# Patient Record
Sex: FEMALE | Race: WHITE | URBAN - METROPOLITAN AREA
[De-identification: names, ages, dates, MRNs, and addresses within clinical notes are randomized per-mention and may not be internally consistent; named-entity substitution may affect disease eponyms.]

---

## 2023-10-25 PROBLEM — O99.212 SEVERE OBESITY DUE TO EXCESS CALORIES AFFECTING PREGNANCY IN SECOND TRIMESTER (HCC): Status: ACTIVE | Noted: 2023-10-25

## 2023-10-25 PROBLEM — E66.01 SEVERE OBESITY DUE TO EXCESS CALORIES AFFECTING PREGNANCY IN SECOND TRIMESTER (HCC): Status: ACTIVE | Noted: 2023-10-25

## 2024-02-13 ENCOUNTER — HOSPITAL ENCOUNTER (INPATIENT)
Age: 34
LOS: 2 days | Discharge: HOME | DRG: 560 | End: 2024-02-16
Attending: OBSTETRICS & GYNECOLOGY | Admitting: OBSTETRICS & GYNECOLOGY
Payer: MEDICAID

## 2024-02-13 ENCOUNTER — APPOINTMENT (OUTPATIENT)
Dept: ULTRASOUND IMAGING | Age: 34
DRG: 560 | End: 2024-02-13
Payer: MEDICAID

## 2024-02-13 PROBLEM — Z3A.34 34 WEEKS GESTATION OF PREGNANCY: Status: ACTIVE | Noted: 2024-02-13

## 2024-02-13 LAB
ALBUMIN SERPL-MCNC: 3.2 G/DL (ref 3.5–5.2)
ALP SERPL-CCNC: 104 U/L (ref 35–104)
ALT SERPL-CCNC: 15 U/L (ref 0–32)
AMPHET UR QL SCN: NEGATIVE
ANION GAP SERPL CALCULATED.3IONS-SCNC: 16 MMOL/L (ref 7–16)
AST SERPL-CCNC: 31 U/L (ref 0–31)
BARBITURATES UR QL SCN: NEGATIVE
BASOPHILS # BLD: 0.03 K/UL (ref 0–0.2)
BASOPHILS NFR BLD: 0 % (ref 0–2)
BENZODIAZ UR QL: NEGATIVE
BILIRUB SERPL-MCNC: 0.2 MG/DL (ref 0–1.2)
BILIRUB UR QL STRIP: NEGATIVE
BUN SERPL-MCNC: 8 MG/DL (ref 6–20)
BUPRENORPHINE UR QL: NEGATIVE
CALCIUM SERPL-MCNC: 9.2 MG/DL (ref 8.6–10.2)
CANNABINOIDS UR QL SCN: NEGATIVE
CHLORIDE SERPL-SCNC: 103 MMOL/L (ref 98–107)
CLARITY UR: CLEAR
CO2 SERPL-SCNC: 19 MMOL/L (ref 22–29)
COCAINE UR QL SCN: NEGATIVE
COLOR UR: YELLOW
CREAT SERPL-MCNC: 0.5 MG/DL (ref 0.5–1)
EOSINOPHIL # BLD: 0 K/UL (ref 0.05–0.5)
EOSINOPHILS RELATIVE PERCENT: 0 % (ref 0–6)
ERYTHROCYTE [DISTWIDTH] IN BLOOD BY AUTOMATED COUNT: 14 % (ref 11.5–15)
FENTANYL UR QL: NEGATIVE
GFR SERPL CREATININE-BSD FRML MDRD: >60 ML/MIN/1.73M2
GLUCOSE SERPL-MCNC: 80 MG/DL (ref 74–99)
GLUCOSE UR STRIP-MCNC: NEGATIVE MG/DL
HCT VFR BLD AUTO: 33.4 % (ref 34–48)
HGB BLD-MCNC: 11 G/DL (ref 11.5–15.5)
HGB UR QL STRIP.AUTO: NEGATIVE
IMM GRANULOCYTES # BLD AUTO: 0.07 K/UL (ref 0–0.58)
IMM GRANULOCYTES NFR BLD: 1 % (ref 0–5)
KETONES UR STRIP-MCNC: NEGATIVE MG/DL
LEUKOCYTE ESTERASE UR QL STRIP: NEGATIVE
LYMPHOCYTES NFR BLD: 2 K/UL (ref 1.5–4)
LYMPHOCYTES RELATIVE PERCENT: 14 % (ref 20–42)
MCH RBC QN AUTO: 27.2 PG (ref 26–35)
MCHC RBC AUTO-ENTMCNC: 32.9 G/DL (ref 32–34.5)
MCV RBC AUTO: 82.5 FL (ref 80–99.9)
METHADONE UR QL: NEGATIVE
MONOCYTES NFR BLD: 0.75 K/UL (ref 0.1–0.95)
MONOCYTES NFR BLD: 5 % (ref 2–12)
NEUTROPHILS NFR BLD: 80 % (ref 43–80)
NEUTS SEG NFR BLD: 11.24 K/UL (ref 1.8–7.3)
NITRITE UR QL STRIP: NEGATIVE
OPIATES UR QL SCN: NEGATIVE
OXYCODONE UR QL SCN: NEGATIVE
PCP UR QL SCN: NEGATIVE
PH UR STRIP: 6 [PH] (ref 5–9)
PLATELET # BLD AUTO: 278 K/UL (ref 130–450)
PMV BLD AUTO: 11.8 FL (ref 7–12)
POTASSIUM SERPL-SCNC: 5.4 MMOL/L (ref 3.5–5)
PROT SERPL-MCNC: 7.1 G/DL (ref 6.4–8.3)
PROT UR STRIP-MCNC: NEGATIVE MG/DL
RBC # BLD AUTO: 4.05 M/UL (ref 3.5–5.5)
RBC #/AREA URNS HPF: NORMAL /HPF
SODIUM SERPL-SCNC: 138 MMOL/L (ref 132–146)
SP GR UR STRIP: 1.02 (ref 1–1.03)
TEST INFORMATION: NORMAL
UROBILINOGEN UR STRIP-ACNC: 0.2 EU/DL (ref 0–1)
WBC #/AREA URNS HPF: NORMAL /HPF
WBC OTHER # BLD: 14.1 K/UL (ref 4.5–11.5)

## 2024-02-13 PROCEDURE — 86850 RBC ANTIBODY SCREEN: CPT

## 2024-02-13 PROCEDURE — 80307 DRUG TEST PRSMV CHEM ANLYZR: CPT

## 2024-02-13 PROCEDURE — 96372 THER/PROPH/DIAG INJ SC/IM: CPT

## 2024-02-13 PROCEDURE — 76770 US EXAM ABDO BACK WALL COMP: CPT

## 2024-02-13 PROCEDURE — 80053 COMPREHEN METABOLIC PANEL: CPT

## 2024-02-13 PROCEDURE — 6360000002 HC RX W HCPCS: Performed by: OBSTETRICS & GYNECOLOGY

## 2024-02-13 PROCEDURE — 96375 TX/PRO/DX INJ NEW DRUG ADDON: CPT

## 2024-02-13 PROCEDURE — G0378 HOSPITAL OBSERVATION PER HR: HCPCS

## 2024-02-13 PROCEDURE — 99221 1ST HOSP IP/OBS SF/LOW 40: CPT | Performed by: ADVANCED PRACTICE MIDWIFE

## 2024-02-13 PROCEDURE — 81001 URINALYSIS AUTO W/SCOPE: CPT

## 2024-02-13 PROCEDURE — 86901 BLOOD TYPING SEROLOGIC RH(D): CPT

## 2024-02-13 PROCEDURE — 86900 BLOOD TYPING SEROLOGIC ABO: CPT

## 2024-02-13 PROCEDURE — 85025 COMPLETE CBC W/AUTO DIFF WBC: CPT

## 2024-02-13 PROCEDURE — 2580000003 HC RX 258: Performed by: ADVANCED PRACTICE MIDWIFE

## 2024-02-13 PROCEDURE — 87086 URINE CULTURE/COLONY COUNT: CPT

## 2024-02-13 RX ORDER — ACETAMINOPHEN 500 MG
500 TABLET ORAL EVERY 6 HOURS
Status: DISCONTINUED | OUTPATIENT
Start: 2024-02-13 | End: 2024-02-14

## 2024-02-13 RX ORDER — SODIUM CHLORIDE 9 MG/ML
INJECTION, SOLUTION INTRAVENOUS CONTINUOUS
Status: ACTIVE | OUTPATIENT
Start: 2024-02-13 | End: 2024-02-13

## 2024-02-13 RX ORDER — BETAMETHASONE SODIUM PHOSPHATE AND BETAMETHASONE ACETATE 3; 3 MG/ML; MG/ML
12 INJECTION, SUSPENSION INTRA-ARTICULAR; INTRALESIONAL; INTRAMUSCULAR; SOFT TISSUE ONCE
Status: COMPLETED | OUTPATIENT
Start: 2024-02-13 | End: 2024-02-13

## 2024-02-13 RX ORDER — BETAMETHASONE SODIUM PHOSPHATE AND BETAMETHASONE ACETATE 3; 3 MG/ML; MG/ML
12 INJECTION, SUSPENSION INTRA-ARTICULAR; INTRALESIONAL; INTRAMUSCULAR; SOFT TISSUE ONCE
Status: DISCONTINUED | OUTPATIENT
Start: 2024-02-14 | End: 2024-02-14

## 2024-02-13 RX ORDER — ONDANSETRON 2 MG/ML
4 INJECTION INTRAMUSCULAR; INTRAVENOUS EVERY 6 HOURS PRN
Status: DISCONTINUED | OUTPATIENT
Start: 2024-02-13 | End: 2024-02-14

## 2024-02-13 RX ORDER — PROMETHAZINE HYDROCHLORIDE 25 MG/ML
12.5 INJECTION, SOLUTION INTRAMUSCULAR; INTRAVENOUS ONCE
Status: COMPLETED | OUTPATIENT
Start: 2024-02-13 | End: 2024-02-14

## 2024-02-13 RX ORDER — SODIUM CHLORIDE 9 MG/ML
INJECTION, SOLUTION INTRAVENOUS CONTINUOUS
Status: DISCONTINUED | OUTPATIENT
Start: 2024-02-13 | End: 2024-02-14

## 2024-02-13 RX ORDER — MEPERIDINE HYDROCHLORIDE 50 MG/ML
50 INJECTION INTRAMUSCULAR; INTRAVENOUS; SUBCUTANEOUS ONCE
Status: DISCONTINUED | OUTPATIENT
Start: 2024-02-13 | End: 2024-02-14

## 2024-02-13 RX ORDER — DIPHENHYDRAMINE HYDROCHLORIDE 50 MG/ML
25 INJECTION INTRAMUSCULAR; INTRAVENOUS EVERY 6 HOURS PRN
Status: DISCONTINUED | OUTPATIENT
Start: 2024-02-13 | End: 2024-02-14

## 2024-02-13 RX ORDER — SODIUM CHLORIDE 9 MG/ML
INJECTION, SOLUTION INTRAVENOUS CONTINUOUS
Status: DISCONTINUED | OUTPATIENT
Start: 2024-02-13 | End: 2024-02-13

## 2024-02-13 RX ADMIN — BUTORPHANOL TARTRATE 2 MG: 2 INJECTION, SOLUTION INTRAMUSCULAR; INTRAVENOUS at 22:21

## 2024-02-13 RX ADMIN — BETAMETHASONE SODIUM PHOSPHATE AND BETAMETHASONE ACETATE 12 MG: 3; 3 INJECTION, SUSPENSION INTRA-ARTICULAR; INTRALESIONAL; INTRAMUSCULAR at 23:13

## 2024-02-13 RX ADMIN — SODIUM CHLORIDE: 9 INJECTION, SOLUTION INTRAVENOUS at 21:52

## 2024-02-13 RX ADMIN — ONDANSETRON 4 MG: 2 INJECTION INTRAMUSCULAR; INTRAVENOUS at 22:20

## 2024-02-13 ASSESSMENT — PAIN SCALES - GENERAL: PAINLEVEL_OUTOF10: 10

## 2024-02-13 ASSESSMENT — PAIN DESCRIPTION - LOCATION: LOCATION: ABDOMEN;BACK

## 2024-02-13 ASSESSMENT — PAIN DESCRIPTION - ORIENTATION: ORIENTATION: RIGHT;LEFT

## 2024-02-14 ENCOUNTER — ANCILLARY PROCEDURE (OUTPATIENT)
Dept: OBGYN CLINIC | Age: 34
DRG: 560 | End: 2024-02-14
Payer: MEDICAID

## 2024-02-14 ENCOUNTER — ANESTHESIA EVENT (OUTPATIENT)
Dept: LABOR AND DELIVERY | Age: 34
DRG: 560 | End: 2024-02-14
Payer: MEDICAID

## 2024-02-14 ENCOUNTER — ANESTHESIA (OUTPATIENT)
Dept: LABOR AND DELIVERY | Age: 34
DRG: 560 | End: 2024-02-14
Payer: MEDICAID

## 2024-02-14 LAB
ABO + RH BLD: NORMAL
ARM BAND NUMBER: NORMAL
BLOOD BANK SAMPLE EXPIRATION: NORMAL
BLOOD GROUP ANTIBODIES SERPL: NEGATIVE

## 2024-02-14 PROCEDURE — 51701 INSERT BLADDER CATHETER: CPT

## 2024-02-14 PROCEDURE — 6360000002 HC RX W HCPCS: Performed by: NURSE ANESTHETIST, CERTIFIED REGISTERED

## 2024-02-14 PROCEDURE — 96376 TX/PRO/DX INJ SAME DRUG ADON: CPT

## 2024-02-14 PROCEDURE — 76819 FETAL BIOPHYS PROFIL W/O NST: CPT | Performed by: OBSTETRICS & GYNECOLOGY

## 2024-02-14 PROCEDURE — 7200000001 HC VAGINAL DELIVERY

## 2024-02-14 PROCEDURE — 76805 OB US >/= 14 WKS SNGL FETUS: CPT | Performed by: OBSTETRICS & GYNECOLOGY

## 2024-02-14 PROCEDURE — 6370000000 HC RX 637 (ALT 250 FOR IP): Performed by: OBSTETRICS & GYNECOLOGY

## 2024-02-14 PROCEDURE — G0378 HOSPITAL OBSERVATION PER HR: HCPCS

## 2024-02-14 PROCEDURE — 2580000003 HC RX 258: Performed by: OBSTETRICS & GYNECOLOGY

## 2024-02-14 PROCEDURE — 88307 TISSUE EXAM BY PATHOLOGIST: CPT

## 2024-02-14 PROCEDURE — 96372 THER/PROPH/DIAG INJ SC/IM: CPT

## 2024-02-14 PROCEDURE — 76820 UMBILICAL ARTERY ECHO: CPT | Performed by: OBSTETRICS & GYNECOLOGY

## 2024-02-14 PROCEDURE — 76821 MIDDLE CEREBRAL ARTERY ECHO: CPT | Performed by: OBSTETRICS & GYNECOLOGY

## 2024-02-14 PROCEDURE — 6360000002 HC RX W HCPCS: Performed by: OBSTETRICS & GYNECOLOGY

## 2024-02-14 PROCEDURE — 1220000000 HC SEMI PRIVATE OB R&B

## 2024-02-14 PROCEDURE — 96365 THER/PROPH/DIAG IV INF INIT: CPT

## 2024-02-14 PROCEDURE — 96366 THER/PROPH/DIAG IV INF ADDON: CPT

## 2024-02-14 PROCEDURE — 3700000025 EPIDURAL BLOCK: Performed by: ANESTHESIOLOGY

## 2024-02-14 PROCEDURE — 2500000003 HC RX 250 WO HCPCS: Performed by: ANESTHESIOLOGY

## 2024-02-14 RX ORDER — ACETAMINOPHEN 500 MG
1000 TABLET ORAL EVERY 8 HOURS SCHEDULED
Status: DISCONTINUED | OUTPATIENT
Start: 2024-02-14 | End: 2024-02-16 | Stop reason: HOSPADM

## 2024-02-14 RX ORDER — FENTANYL CITRATE 50 UG/ML
INJECTION, SOLUTION INTRAMUSCULAR; INTRAVENOUS PRN
Status: DISCONTINUED | OUTPATIENT
Start: 2024-02-14 | End: 2024-02-14 | Stop reason: SDUPTHER

## 2024-02-14 RX ORDER — SIMETHICONE 80 MG
80 TABLET,CHEWABLE ORAL 4 TIMES DAILY
Status: DISCONTINUED | OUTPATIENT
Start: 2024-02-14 | End: 2024-02-16 | Stop reason: HOSPADM

## 2024-02-14 RX ORDER — DOCUSATE SODIUM 100 MG/1
100 CAPSULE, LIQUID FILLED ORAL 2 TIMES DAILY
Status: DISCONTINUED | OUTPATIENT
Start: 2024-02-14 | End: 2024-02-16 | Stop reason: HOSPADM

## 2024-02-14 RX ORDER — BETAMETHASONE SODIUM PHOSPHATE AND BETAMETHASONE ACETATE 3; 3 MG/ML; MG/ML
12 INJECTION, SUSPENSION INTRA-ARTICULAR; INTRALESIONAL; INTRAMUSCULAR; SOFT TISSUE ONCE
Status: COMPLETED | OUTPATIENT
Start: 2024-02-14 | End: 2024-02-14

## 2024-02-14 RX ORDER — BUPIVACAINE HYDROCHLORIDE 2.5 MG/ML
INJECTION, SOLUTION EPIDURAL; INFILTRATION; INTRACAUDAL PRN
Status: DISCONTINUED | OUTPATIENT
Start: 2024-02-14 | End: 2024-02-14 | Stop reason: SDUPTHER

## 2024-02-14 RX ORDER — HYDROCODONE BITARTRATE AND ACETAMINOPHEN 5; 325 MG/1; MG/1
1 TABLET ORAL EVERY 6 HOURS PRN
Status: DISCONTINUED | OUTPATIENT
Start: 2024-02-14 | End: 2024-02-16 | Stop reason: HOSPADM

## 2024-02-14 RX ORDER — IBUPROFEN 800 MG/1
800 TABLET ORAL EVERY 8 HOURS SCHEDULED
Status: DISCONTINUED | OUTPATIENT
Start: 2024-02-14 | End: 2024-02-16 | Stop reason: HOSPADM

## 2024-02-14 RX ORDER — MEPERIDINE HYDROCHLORIDE 50 MG/ML
50 INJECTION INTRAMUSCULAR; INTRAVENOUS; SUBCUTANEOUS ONCE
Status: COMPLETED | OUTPATIENT
Start: 2024-02-14 | End: 2024-02-14

## 2024-02-14 RX ORDER — NALOXONE HYDROCHLORIDE 0.4 MG/ML
INJECTION, SOLUTION INTRAMUSCULAR; INTRAVENOUS; SUBCUTANEOUS PRN
Status: DISCONTINUED | OUTPATIENT
Start: 2024-02-14 | End: 2024-02-14

## 2024-02-14 RX ORDER — BISACODYL 10 MG
10 SUPPOSITORY, RECTAL RECTAL PRN
Status: DISCONTINUED | OUTPATIENT
Start: 2024-02-14 | End: 2024-02-16 | Stop reason: HOSPADM

## 2024-02-14 RX ORDER — MODIFIED LANOLIN
OINTMENT (GRAM) TOPICAL PRN
Status: DISCONTINUED | OUTPATIENT
Start: 2024-02-14 | End: 2024-02-16 | Stop reason: HOSPADM

## 2024-02-14 RX ORDER — FERROUS SULFATE 325(65) MG
325 TABLET ORAL EVERY OTHER DAY
Status: DISCONTINUED | OUTPATIENT
Start: 2024-02-15 | End: 2024-02-16 | Stop reason: HOSPADM

## 2024-02-14 RX ORDER — SODIUM CHLORIDE 0.9 % (FLUSH) 0.9 %
5-40 SYRINGE (ML) INJECTION EVERY 12 HOURS SCHEDULED
Status: DISCONTINUED | OUTPATIENT
Start: 2024-02-14 | End: 2024-02-16 | Stop reason: HOSPADM

## 2024-02-14 RX ORDER — PENICILLIN G 3000000 [IU]/50ML
3 INJECTION, SOLUTION INTRAVENOUS EVERY 4 HOURS
Status: DISCONTINUED | OUTPATIENT
Start: 2024-02-14 | End: 2024-02-14

## 2024-02-14 RX ORDER — SODIUM CHLORIDE 0.9 % (FLUSH) 0.9 %
5-40 SYRINGE (ML) INJECTION PRN
Status: DISCONTINUED | OUTPATIENT
Start: 2024-02-14 | End: 2024-02-16 | Stop reason: HOSPADM

## 2024-02-14 RX ORDER — ACETAMINOPHEN 650 MG
TABLET, EXTENDED RELEASE ORAL
Status: DISCONTINUED
Start: 2024-02-14 | End: 2024-02-14

## 2024-02-14 RX ORDER — ONDANSETRON 2 MG/ML
4 INJECTION INTRAMUSCULAR; INTRAVENOUS EVERY 6 HOURS PRN
Status: DISCONTINUED | OUTPATIENT
Start: 2024-02-14 | End: 2024-02-14

## 2024-02-14 RX ORDER — LIDOCAINE HYDROCHLORIDE 10 MG/ML
INJECTION, SOLUTION INFILTRATION; PERINEURAL
Status: DISCONTINUED
Start: 2024-02-14 | End: 2024-02-14 | Stop reason: WASHOUT

## 2024-02-14 RX ORDER — SODIUM CHLORIDE 9 MG/ML
INJECTION, SOLUTION INTRAVENOUS PRN
Status: DISCONTINUED | OUTPATIENT
Start: 2024-02-14 | End: 2024-02-16 | Stop reason: HOSPADM

## 2024-02-14 RX ADMIN — Medication 87.3 MILLI-UNITS/MIN: at 17:42

## 2024-02-14 RX ADMIN — BETAMETHASONE SODIUM PHOSPHATE AND BETAMETHASONE ACETATE 12 MG: 3; 3 INJECTION, SUSPENSION INTRA-ARTICULAR; INTRALESIONAL; INTRAMUSCULAR at 12:58

## 2024-02-14 RX ADMIN — DOCUSATE SODIUM 100 MG: 100 CAPSULE, LIQUID FILLED ORAL at 21:08

## 2024-02-14 RX ADMIN — FENTANYL CITRATE 50 MCG: 50 INJECTION, SOLUTION INTRAMUSCULAR; INTRAVENOUS at 16:38

## 2024-02-14 RX ADMIN — PENICILLIN G 3 MILLION UNITS: 3000000 INJECTION, SOLUTION INTRAVENOUS at 12:58

## 2024-02-14 RX ADMIN — BUPIVACAINE HYDROCHLORIDE 2 MG: 2.5 INJECTION, SOLUTION EPIDURAL; INFILTRATION; INTRACAUDAL; PERINEURAL at 16:40

## 2024-02-14 RX ADMIN — BUTORPHANOL TARTRATE 2 MG: 2 INJECTION, SOLUTION INTRAMUSCULAR; INTRAVENOUS at 03:51

## 2024-02-14 RX ADMIN — DEXTROSE MONOHYDRATE 5 MILLION UNITS: 50 INJECTION, SOLUTION INTRAVENOUS at 00:30

## 2024-02-14 RX ADMIN — PROMETHAZINE HYDROCHLORIDE 12.5 MG: 25 INJECTION INTRAMUSCULAR; INTRAVENOUS at 01:00

## 2024-02-14 RX ADMIN — FENTANYL CITRATE 50 MCG: 50 INJECTION, SOLUTION INTRAMUSCULAR; INTRAVENOUS at 16:35

## 2024-02-14 RX ADMIN — Medication: at 17:26

## 2024-02-14 RX ADMIN — Medication 166.7 ML: at 17:31

## 2024-02-14 RX ADMIN — BUPIVACAINE HYDROCHLORIDE 3 MG: 2.5 INJECTION, SOLUTION EPIDURAL; INFILTRATION; INTRACAUDAL; PERINEURAL at 16:34

## 2024-02-14 RX ADMIN — PENICILLIN G 3 MILLION UNITS: 3000000 INJECTION, SOLUTION INTRAVENOUS at 08:35

## 2024-02-14 RX ADMIN — PENICILLIN G 3 MILLION UNITS: 3000000 INJECTION, SOLUTION INTRAVENOUS at 17:00

## 2024-02-14 RX ADMIN — SODIUM CHLORIDE: 9 INJECTION, SOLUTION INTRAVENOUS at 11:56

## 2024-02-14 RX ADMIN — Medication 15 ML/HR: at 13:43

## 2024-02-14 RX ADMIN — PENICILLIN G 3 MILLION UNITS: 3000000 INJECTION, SOLUTION INTRAVENOUS at 04:31

## 2024-02-14 RX ADMIN — Medication 1 MILLI-UNITS/MIN: at 14:10

## 2024-02-14 RX ADMIN — MEPERIDINE HYDROCHLORIDE 50 MG: 50 INJECTION, SOLUTION INTRAMUSCULAR; INTRAVENOUS; SUBCUTANEOUS at 01:00

## 2024-02-14 RX ADMIN — BUTORPHANOL TARTRATE 2 MG: 2 INJECTION, SOLUTION INTRAMUSCULAR; INTRAVENOUS at 08:46

## 2024-02-14 RX ADMIN — BUPIVACAINE HYDROCHLORIDE 3 MG: 2.5 INJECTION, SOLUTION EPIDURAL; INFILTRATION; INTRACAUDAL; PERINEURAL at 16:37

## 2024-02-14 RX ADMIN — IBUPROFEN 800 MG: 800 TABLET, FILM COATED ORAL at 18:22

## 2024-02-14 ASSESSMENT — PAIN DESCRIPTION - DESCRIPTORS
DESCRIPTORS: CRAMPING
DESCRIPTORS: ACHING;CRAMPING;DISCOMFORT;SHARP
DESCRIPTORS: ACHING;PRESSURE;CRAMPING
DESCRIPTORS: CRAMPING;TIGHTNESS

## 2024-02-14 ASSESSMENT — PAIN SCALES - GENERAL
PAINLEVEL_OUTOF10: 8
PAINLEVEL_OUTOF10: 8
PAINLEVEL_OUTOF10: 3
PAINLEVEL_OUTOF10: 8

## 2024-02-14 ASSESSMENT — PAIN DESCRIPTION - LOCATION
LOCATION: ABDOMEN
LOCATION: ABDOMEN;BACK
LOCATION: BACK
LOCATION: ABDOMEN;BACK

## 2024-02-14 ASSESSMENT — PAIN - FUNCTIONAL ASSESSMENT
PAIN_FUNCTIONAL_ASSESSMENT: ACTIVITIES ARE NOT PREVENTED

## 2024-02-14 ASSESSMENT — PAIN DESCRIPTION - ORIENTATION
ORIENTATION: LOWER
ORIENTATION: LOWER

## 2024-02-14 NOTE — PROGRESS NOTES
Dr Gordon returned call. Informed of epidural placement, sve unchanged, fhr tracing and ctx pattern. Informed ctx pattern tracing well at this time but pt on back after epidural.   Orders received for pitocin and IUPC if needed.

## 2024-02-14 NOTE — ANESTHESIA PRE PROCEDURE
Department of Anesthesiology  Preprocedure Note       Name:  Skyla Prado   Age:  33 y.o.  :  1990                                          MRN:  [id number]         Date:  2024      Surgeon: * No surgeons listed *    Procedure: * No procedures listed *    Medications prior to admission:   Prior to Admission medications    Medication Sig Start Date End Date Taking? Authorizing Provider   Prenatal Vit-Fe Fumarate-FA (PRENATAL VITAMIN PO) Take by mouth    Provider, MD Paul       Current medications:    Current Facility-Administered Medications   Medication Dose Route Frequency Provider Last Rate Last Admin    penicillin G potassium IVPB 3 Million Units  3 Million Units IntraVENous Q4H Kory Gordon  mL/hr at 24 0431 3 Million Units at 24 0431    0.9 % sodium chloride infusion   IntraVENous Continuous Kory Gordon MD        ondansetron (ZOFRAN) injection 4 mg  4 mg IntraVENous Q6H PRN Kory Gordon MD   4 mg at 24 2220    butorphanol (STADOL) injection 2 mg  2 mg IntraVENous Q2H PRN Kory Gordon MD   2 mg at 24 0351    acetaminophen (TYLENOL) tablet 500 mg  500 mg Oral Q6H Kory Gordon MD        betamethasone acetate-betamethasone sodium phosphate (CELESTONE) injection 12 mg  12 mg IntraMUSCular Once Kory Gordon MD        diphenhydrAMINE (BENADRYL) injection 25 mg  25 mg IntraVENous Q6H PRN Kory Gordon MD           Allergies:    Allergies   Allergen Reactions    Honey Shortness Of Breath    Morphine Hives and Shortness Of Breath    Onion Hives, Shortness Of Breath and Swelling    Strawberry Hives and Shortness Of Breath    Albuterol Seizure    Codeine Hives    Hydrocodone-Acetaminophen Hives    Inositol Hexaphosphate Sodium Salt     Iodine Hives    Potassium Bromide Other (See Comments)    Prednisone Other (See Comments)     All sterroids give a bad reaction    Red Dye Hives    Warfarin Hives     All blood thinners      Bee Venom Rash  and Swelling    Prednisolone Anxiety       Problem List:    Patient Active Problem List   Diagnosis Code    Severe obesity due to excess calories affecting pregnancy in second trimester ([facility]) O99.212, E66.01    34 weeks gestation of pregnancy Z3A.34       Past Medical History:        Diagnosis Date    Asthma     Mental disorder     anxiety/panic attacks       Past Surgical History:        Procedure Laterality Date    CARPAL TUNNEL RELEASE Right 2019    CHOLECYSTECTOMY      KNEE ARTHROSCOPY W/ MENISCAL REPAIR Right 2007    KNEE SURGERY Right 2014    KNEE SURGERY  2016    LITHOTRIPSY  2010    TONSILLECTOMY         Social History:    Social History     Tobacco Use    Smoking status: Never    Smokeless tobacco: Never   Substance Use Topics    Alcohol use: Not Currently                                Counseling given: Not Answered      Vital Signs (Current):   Vitals:    02/13/24 1909 02/13/24 2353 02/14/24 0258   BP: 133/77 (!) 143/78 (!) 109/54   Pulse: 89 86 83   Resp: 16 16 16   Temp: 36.8 °C (98.3 °F)  36.7 °C (98.1 °F)   TempSrc: Oral  Oral                                              BP Readings from Last 3 Encounters:   02/14/24 (!) 109/54   01/24/24 120/83   01/10/24 129/82       NPO Status:                                                                                 BMI:   Wt Readings from Last 3 Encounters:   01/24/24 115.7 kg (255 lb)   01/10/24 116.1 kg (256 lb)   01/03/24 115.2 kg (254 lb)     There is no height or weight on file to calculate BMI.    CBC:   Lab Results   Component Value Date/Time    WBC 14.1 02/13/2024 09:54 PM    RBC 4.05 02/13/2024 09:54 PM    RBC 4.43 01/03/2024 12:30 PM    HGB 11.0 02/13/2024 09:54 PM    HCT 33.4 02/13/2024 09:54 PM    MCV 82.5 02/13/2024 09:54 PM    RDW 14.0 02/13/2024 09:54 PM     02/13/2024 09:54 PM       CMP:   Lab Results   Component Value Date/Time     02/13/2024 09:54 PM    K 5.4 02/13/2024 09:54 PM     02/13/2024 09:54 PM    CO2 19  02/13/2024 09:54 PM    BUN 8 02/13/2024 09:54 PM    CREATININE 0.5 02/13/2024 09:54 PM    LABGLOM >60 02/13/2024 09:54 PM    GLUCOSE 80 02/13/2024 09:54 PM    PROT 7.1 02/13/2024 09:54 PM    CALCIUM 9.2 02/13/2024 09:54 PM    BILITOT 0.2 02/13/2024 09:54 PM    ALKPHOS 104 02/13/2024 09:54 PM    AST 31 02/13/2024 09:54 PM    ALT 15 02/13/2024 09:54 PM       POC Tests: No results for input(s): \"POCGLU\", \"POCNA\", \"POCK\", \"POCCL\", \"POCBUN\", \"POCHEMO\", \"POCHCT\" in the last 72 hours.    Coags: No results found for: \"PROTIME\", \"INR\", \"APTT\"    HCG (If Applicable):   Lab Results   Component Value Date    PREGTESTUR positive 10/25/2023        ABGs: No results found for: \"PHART\", \"PO2ART\", \"DKV9FZE\", \"VET3OWI\", \"BEART\", \"T0DLIMLT\"     Type & Screen (If Applicable):  No results found for: \"LABABO\", \"LABRH\"    Drug/Infectious Status (If Applicable):  No results found for: \"HIV\", \"HEPCAB\"    COVID-19 Screening (If Applicable): No results found for: \"COVID19\"        Anesthesia Evaluation  Patient summary reviewed and Nursing notes reviewed   no history of anesthetic complications:   Airway: Mallampati: III  TM distance: >3 FB   Neck ROM: full  Mouth opening: > = 3 FB   Dental: normal exam         Pulmonary:normal exam  breath sounds clear to auscultation  (+)           asthma (has not used an inhaler in 2-3 yrs):     (-) not a current smoker                           Cardiovascular:             Beta Blocker:  Not on Beta Blocker         Neuro/Psych:   (+) depression/anxiety              ROS comment: Back pain w/ pregnancy, neuropathy in LUE GI/Hepatic/Renal:   (+) morbid obesity          Endo/Other:                     Abdominal:   (+) obese          Vascular: negative vascular ROS.         Other Findings:             Anesthesia Plan        Ritika Nicholas RN   2/14/2024

## 2024-02-14 NOTE — PROGRESS NOTES
MFM Note    The patient is a 33-year-old 3 para 2-0-0-2 currently at 34 weeks 2 days (LMP = 10 WK US) who presented to the hospital with contraction pain.  Cervical change was noted and she was diagnosed with  labor.  She was given betamethasone for fetal benefit on .  She is scheduled for her second dose today.  She has been receiving antibiotics for GBS prophylaxis.  An [facility] consult was requested.    Ultrasound was completed at 34 weeks 2 days.  There was a single intrauterine gestation in cephalic presentation with a heart rate of 125 bpm.  The placenta is fundal.  The LEENA was 6.4 cm.  No 2 x 2 centimeter pocket of fluid was seen.  The composite gestational age was 32 weeks 6 days.  The estimated fetal weight was 4 pounds 10 ounces, 33rd percentile.  AC 18 percentile and femur length 12 percentile.  The umbilical artery PI was intermittently elevated.  End-diastolic flow was seen.  MCA PI normal.  CPR PI normal.  The left fetal renal pelvis measured 6.1 mm and right 4.5 mm.  The upper limit of normal at this gestation is 7 mm, thus, the left renal pelvis appears prominent.    The patient was visibly uncomfortable with contractions during her ultrasound.  Sterile digital exam was repeated following the ultrasound.  She was noted to be 4 cm dilated, 80% effaced, soft, and mid position.    Based on the patient's presentation and ultrasound findings, there is concern for PPROM.    The following recommendations were provided to the patient:  Continued expectant management  If contractions space recommend augmentation secondary finding of decreased LEENA and likely PPROM  Continue antibiotics for GBS prophylaxis  Betamethasone #2 today  NICU consult  Recommend  renal ultrasound given appearance of the fetal kidneys.  This information should be relayed to pediatrics.      Dr. Gordon was contacted regarding the ultrasound findings and recommendations outlined above.    A formal consult note will

## 2024-02-14 NOTE — PROGRESS NOTES
Patient c/o left sided pain with contractions, Epidural bolused with o,25% bupivacaine and 100 mcgs fentanyl. Vital signs stable.

## 2024-02-14 NOTE — CONSULTS
A consult was requested by Dr. Gordon       RE:  SKYLA PRADO  : 1990   AGE: 33 y.o.    Dear  No ref. provider found:    I saw your patient Skyla Prado today for the following indications:  labor    Patient Active Problem List   Diagnosis    Severe obesity due to excess calories affecting pregnancy in second trimester (HCC)    34 weeks gestation of pregnancy       As you know, Ms. Prado is a 33 y.o.  at 34w2d  (***) who is admitted with  ***.  The patient reports that she started having contractions at 1:30 plm on 24.  The pain became progressively worse and she came to the hospital.     She reports good fetal movement.  She denies having any leaking of fluid or vaginal bleeding. She reports having contractions that she rates as a 3-8/10.     A Maternal-Fetal Medicine consult was requested by Dr. Gordon to address these issue(s).     OB History    Para Term  AB Living   3 2 2         SAB IAB Ectopic Molar Multiple Live Births                    # Outcome Date GA Lbr Hemanth/2nd Weight Sex Delivery Anes PTL Lv   3 Current            2 Term 18    F Vag-Spont      1 Term 13    M Vag-Spont          PAST OBSTETRICAL HISTORY:    2013 -- 41 week  of a 6lb male (T[place]in).  She denies having any other complications with the pregnancy, delivery, and/or postpartum recovery.  She reports that her son is living and well.      -- 39w1d  of a 6lb 12oz female (Delia).  There was fetal echogenic bowel, but no issues after delivery. She denies having any other complications with the pregnancy, delivery, and/or postpartum recovery.  She reports that her daughter is living and well.     All pregnancies are with the same partner.       PAST GYNECOLOGICAL  HISTORY:  Negative for abnormal pap smears.   Negative for sexually transmitted diseases.   Negative for cervical LEEP / conization /cryosurgery.    Negative for uterine surgery.   Negative for  be dependent on her clinical presentation and the results of her testing.       --The patient was advised to call if she has any increased vaginal discharge, vaginal bleeding, contractions, abdominal pain, back pain or any new significant symptomatology prior to her next visit. I advised her that these are signs and symptoms of cervical change and require follow-up assessment when they occur.  Preeclampsia precautions were also reviewed with the patient.       --The patient was also counseled to call and/or return with any concerns for decreased fetal activity.    The patient is to continue to follow with you in your office for ongoing obstetric care.    If you have any questions regarding her management, please contact me at your convenience and thank you for allowing me to participate in her care.    Sincerely,          María Osman MD, FACOG [facility] M[facility]  [phone] option 1   [address] [place] [address], [address] [place] [address]        *All or parts of this note may have been generated using a voice recognition program. There may be typo, grammar, or Word substitution errors that have escaped my review of this note.

## 2024-02-14 NOTE — PROGRESS NOTES
Dr. Gordon called in for pt update. Notified pt is less uncomfortable, but still feeling contractions. Orders to recheck pt and call back.

## 2024-02-14 NOTE — PROGRESS NOTES
1723 - dr maria present, bladder emptied, nicu called for delivery.    1727-  Patient actively pushing x1.  RN and physician remains in continuous attendance at the bedside.  Assessment & evaluation of fetal heart rate ongoing via continuous EFM. RN and physician remained at bedside throughout pushing.  EFM continuously assessed.  Vaginal delivery of viable infant boy at 1727, nuchal x2 reduced,  cried and suctioned at the perineum,  nicu present 10sec after delivery. 30sec delay cord clamping, apgars 8/9 per nicu.    to nicu due to prematurity. Mother stable.

## 2024-02-14 NOTE — PROGRESS NOTES
[id number] - RN continuously at bedside adjusting ultrasound EFM, FHR difficult to continually monitor while epidural being assessed by CRNA.   Epidural repositioned and bolused by crna, pt feeling relief now.

## 2024-02-14 NOTE — PROGRESS NOTES
Patient requesting to not be on monitor due to abdominal pain and tenderness. Reports positive fetal movement and denies LOF or vaginal bleeding. Dr. Gordon notified of increased pain to back radiating to front, new order to give stadol 2mg Q3PRN and Zofran 4mg Q4 PRN.

## 2024-02-14 NOTE — PROGRESS NOTES
Called out to Dr. Gordon. Updated SVE is unchanged and pt is irregularly landon. No new orders at this time.

## 2024-02-14 NOTE — PROGRESS NOTES
S:  Skyla Prado is a 33 y.o. female 34w2d present to labor and delivery with  contractions. Upon arrival patient was 1 cm. She is landon every 2-5 minutes. Contractions are getting strong and closer together. She received IV pain medication.     O:  BP: (!) 119/55  Pulse: 84  Patient Position: Lying left side  Dilation (cm): 3 (3-4)  Effacement: 70  Station: -2    No results found for: \"GBSCX\", \"ORG\"    A:  Patient Active Problem List   Diagnosis    Severe obesity due to excess calories affecting pregnancy in second trimester (HCC)    34 weeks gestation of pregnancy     Category 1 tracing       P:  MFM consult  Received 1 dose of celestone last night  Prophylactic antibiotics to be given  NICU consult   Dr. Gordon notifed of pt status

## 2024-02-14 NOTE — CONSULTS
PRENATAL NEONATOLOGY CONSULT     Asked to see patient for: pre-term labor at 34 2/7 weeks  Requesting physician: Dr. Gordon  Primary OB: Dr. Gordon  Future pediatrician/family practitioner: will need to determine.    Skyla Prado is a 33 y.o.  female pregnant at 34w2d with Estimated Date of Delivery: 3/25/24.     OB History    Para Term  AB Living   3 2 2 0 0 0   SAB IAB Ectopic Molar Multiple Live Births   0 0 0 0 0 0      # Outcome Date GA Lbr Hemanth/2nd Weight Sex Delivery Anes PTL Lv   3 Current            2 Term 18    F Vag-Spont      1 Term 13    M Vag-Spont        PRENATAL COURSE / MATERNAL DATA:   Mother's name: Skyla Prado    Prenatal Care: Good prenatal care.     Prenatal Labs:  Maternal blood type: A POSITIVE    GBS: unknown  HBsAg: negative  Hep C: negative  Rubella: immune  RPR/VDRL: negative  HIV:negative  GC: negative  Chlamydia: negative  UDS:Negative  Glucose Tolerance Test: normal (failed 1 hour, passed 2 hour)  Other Screenings:     Maternal concerns:   Patient Active Problem List   Diagnosis    Severe obesity due to excess calories affecting pregnancy in second trimester (HCC)    34 weeks gestation of pregnancy       Home medication during pregnancy:   No current facility-administered medications on file prior to encounter.     Current Outpatient Medications on File Prior to Encounter   Medication Sig Dispense Refill    Prenatal Vit-Fe Fumarate-FA (PRENATAL VITAMIN PO) Take by mouth         Antepartum pregnancy complications:Presented with abdominal/back pain and found to be in  labor at 34 2/7 weeks. Pregnancy complicated by IUGR fetus (10% at 27 weeks). Receiving penicillin for GBS unknown status. S/p celestone x 1, second dose due .     Maternal antibiotics: penicillin class  Other Medications: Prenatal vitamin   Celestone: , second dose due   ROM:   /       SOCIAL HISTORY:   Marital status:     Maternal Substance Abuse:    Alcohol intake:none  Tobacco use: never  Drugs: Never      Reviewed with Patient:  General: NICU staff at delivery; possible need for resuscitation, need for admission to NICU, visitation policy, expected length of stay  Respiratory: risk of RDS; Respiratory support: CPAP, ETT/mechanical ventilation/surfactant; need for supplemental oxygen; risk of chronic lung disease  FEN:Breast or formula; donor milk, IVF/TPN, gavage feeds/oral feeds, Kangaroo care, risk of NEC, pump by 6 hours and 8x per day  Infection: Risk factors for infection; need to start antibiotics  Misc: Discharge criteria  Other:     Code status discussed: no    Impression:  Ms. Prado is a 34 yo  mother presenting with concern for pre-term labor at 34 2/7 weeks. Pregnancy complicated by IUGR fetus. Currently receiving penicillin for GBS unknown and Celestone.     Recommendations:  -Anticipatory guidance provided as noted above to Ms. Prado and her   -Please call the NICU team with additional questions/concerns.      Electronically signed by Kathleen S Schwabenbauer, MD on 2024 at 5:30 PM      I spent 30 minutes completing this consult.  More than 50% of the time was spent discussing the prognosis and the expected hospital course for this patient's infant.

## 2024-02-14 NOTE — PROGRESS NOTES
Call placed to Dr. Osman on Dr. Gordon's request. Per Dr. Osman give steroids and manage expectantly.

## 2024-02-14 NOTE — H&P
CHIEF COMPLAINT:  came in having lower back pain that wraps around front since 130 woke her up from nap, no lof vb or pre-e sx, no urinary sx, +FM    HISTORY OF PRESENT ILLNESS:      The patient is a 33 y.o. female at 34w1d.  OB History          3    Para   2    Term   2            AB        Living             SAB        IAB        Ectopic        Molar        Multiple        Live Births                Patient presents with a chief complaint as above and is being admitted for observation    Estimated Due Date: Estimated Date of Delivery: 3/25/24    PRENATAL CARE:    Complicated by: obesity, nausea and vomiting in pregnancy, IUGR, Hx kidney stones    PAST OB HISTORY  OB History          3    Para   2    Term   2            AB        Living             SAB        IAB        Ectopic        Molar        Multiple        Live Births                    Past Medical History:        Diagnosis Date    Asthma     Mental disorder     anxiety/panic attacks     Past Surgical History:        Procedure Laterality Date    CARPAL TUNNEL RELEASE Right 2019    CHOLECYSTECTOMY      KNEE ARTHROSCOPY W/ MENISCAL REPAIR Right 2007    KNEE SURGERY Right 2014    KNEE SURGERY  2016    LITHOTRIPSY  2010    TONSILLECTOMY       Allergies:  Honey, Morphine, Onion, Strawberry, Albuterol, Codeine, Hydrocodone-acetaminophen, Inositol hexaphosphate sodium salt, Iodine, Potassium bromide, Prednisone, Red dye, Warfarin, Bee venom, and Prednisolone  Social History:    Social History     Socioeconomic History    Marital status:      Spouse name: Not on file    Number of children: Not on file    Years of education: Not on file    Highest education level: Not on file   Occupational History    Not on file   Tobacco Use    Smoking status: Never    Smokeless tobacco: Never   Vaping Use    Vaping Use: Never used   Substance and Sexual Activity    Alcohol use: Not Currently    Drug use: Not Currently    Sexual activity: Not  Currently   Other Topics Concern    Not on file   Social History Narrative    Not on file     Social Determinants of Health     Financial Resource Strain: Not on file   Food Insecurity: Not on file   Transportation Needs: Not on file   Physical Activity: Not on file   Stress: Not on file   Social Connections: Not on file   Intimate Partner Violence: Not on file   Housing Stability: Not on file     Family History:   History reviewed. No pertinent family history.  Medications Prior to Admission:  Medications Prior to Admission: Prenatal Vit-Fe Fumarate-FA (PRENATAL VITAMIN PO), Take by mouth    REVIEW OF SYSTEMS:    CONSTITUTIONAL:  negative  HEENT: negative  BREAST: negative  RESPIRATORY:  negative  CARDIOVASCULAR:  negative  GASTROINTESTINAL:  negative  : negative  ALLERGIC/IMMUNOLOGIC:  negative  NEUROLOGICAL:  negative  ENDOCRINE: negative  BEHAVIOR/PSYCH:  negative    PHYSICAL EXAM:  Vitals:    02/13/24 1909   BP: 133/77   Pulse: 89   Resp: 16   Temp: 98.3 °F (36.8 °C)   TempSrc: Oral     General appearance:  awake, alert, cooperative, no apparent distress, and appears stated age  Skin: warm and dry.  No rash observed  Breast: Warm, no redness or masses felt.  No nipple discharge  Neurologic:  Awake, alert, oriented to name, place and time.    Lungs:  No increased work of breathing, good air exchange  Abdomen:  Soft, non tender, gravid, consistent with her gestational age, EFW by Leopald's manouever was aga   Fetal heart rate:  Reassuring.  Pelvis:  Adequate pelvis  Cervix:    1/70/-2  Contraction frequency:  hard to monitor abd soft    Membranes:  Intact    ASSESSMENT AND PLAN:    Discussed with Dr Heidi ESCOBAR, CBC, CMP, UA, UDS, Renal u/s  Fetal monitor    YOUSUF Guevara - MARTÍNEZ

## 2024-02-14 NOTE — ANESTHESIA PROCEDURE NOTES
Epidural Block    Patient location during procedure: OB  Start time: 2/14/2024 1:30 PM  End time: 2/14/2024 1:42 PM  Reason for block: labor epidural  Staffing  Performed: other anesthesia staff   Anesthesiologist: Aldo Scott DO  Resident/CRNA: Yoly Gray APRN - CRNA  Other anesthesia staff: Ritika Nicholas RN  Performed by: Ritika Nicholas RN  Authorized by: Kory Gordon MD    Epidural  Patient position: sitting  Prep: ChloraPrep  Patient monitoring: continuous pulse ox and frequent blood pressure checks  Approach: midline  Location: L3-4  Injection technique: AG air  Provider prep: mask and sterile gloves  Needle  Needle type: Tuohy   Needle gauge: 16 G  Needle length: 3.5 in  Needle insertion depth: 9 cm  Catheter type: end hole  Catheter size: 20 G  Catheter at skin depth: 20 cm  Test dose: negativeCatheter Secured: tegaderm and tape  Assessment  Hemodynamics: stable  Attempts: 1  Outcomes: uncomplicated and patient tolerated procedure well  Preanesthetic Checklist  Completed: patient identified, IV checked, site marked, risks and benefits discussed, surgical/procedural consents, equipment checked, pre-op evaluation, timeout performed, anesthesia consent given, oxygen available, monitors applied/VS acknowledged, fire risk safety assessment completed and verbalized and blood product R/B/A discussed and consented

## 2024-02-14 NOTE — PROGRESS NOTES
Pt. States stadol did no help much with the pain. Dr. Gordon updated on SVE, and contractions palpated as mild. Order given to call Dr. Osman for orders and start steroids. Move patient to L&D.

## 2024-02-14 NOTE — PROGRESS NOTES
Pt complains of left sided pain, pca dose given, pt on left side. SVE 4-5cm. Dr Gordon called in and updated on fhr tracing / ctx pattern with IUPC, and pitocin rate, and sve.

## 2024-02-14 NOTE — PROGRESS NOTES
34w1d  Patient presents to antepartum from home c/o sharp shooting pain through vagina and pressure pain from back to pelvis. Patient denies VB and LOF. +FM

## 2024-02-14 NOTE — L&D DELIVERY SUMMARY NOTE
Vaginal Delivery Note    Details of Procedure:   The patient is a 33 y.o. female at 34w2d   OB History          3    Para   2    Term   2            AB        Living             SAB        IAB        Ectopic        Molar        Multiple        Live Births                 who was admitted for active phase labor. She received the following interventions: IV Pitocin augmentation She was known to be GBS negative and did receive antibiotic prophylaxis. The patient progressed well,did receive an epidural, became complete and started to push. After pushing for 1 tome the fetal head was at the perineum, nose and mouth suctioned with bulb suction and the rest of the infant delivered atraumatically, placed on mother abdomen.Cord was clamped and cut and infant handed off to the waiting nurse for evaluation. The delivery of the placenta was spontaneous. The perineum and vagina were explored and was found to be intact.    male infant delivered at 1727, Apgars of 8 and 9. 4 lb 8 oz 2050 grams    Anesthesia:  epidural anesthesia    Estimated blood loss:  300ml    Specimen:  Placenta sent to pathology     Cord blood sent Yes    Complications:  none    Condition:  infant stable to general nursery    Kory Gordon MD M.D. FACOG

## 2024-02-14 NOTE — PROGRESS NOTES
Dr maria called in, spoke to Dr Osman. Orders received for pt to get epidural, SVE then update dr maria and to give celestone now.

## 2024-02-15 LAB
HCT VFR BLD AUTO: 31.3 % (ref 34–48)
HGB BLD-MCNC: 9.8 G/DL (ref 11.5–15.5)
MICROORGANISM SPEC CULT: ABNORMAL
MICROORGANISM SPEC CULT: ABNORMAL
SPECIMEN DESCRIPTION: ABNORMAL

## 2024-02-15 PROCEDURE — 85014 HEMATOCRIT: CPT

## 2024-02-15 PROCEDURE — 1220000000 HC SEMI PRIVATE OB R&B

## 2024-02-15 PROCEDURE — 36415 COLL VENOUS BLD VENIPUNCTURE: CPT

## 2024-02-15 PROCEDURE — 6370000000 HC RX 637 (ALT 250 FOR IP): Performed by: OBSTETRICS & GYNECOLOGY

## 2024-02-15 PROCEDURE — 6360000002 HC RX W HCPCS: Performed by: OBSTETRICS & GYNECOLOGY

## 2024-02-15 PROCEDURE — 85018 HEMOGLOBIN: CPT

## 2024-02-15 PROCEDURE — 90471 IMMUNIZATION ADMIN: CPT | Performed by: OBSTETRICS & GYNECOLOGY

## 2024-02-15 PROCEDURE — 90715 TDAP VACCINE 7 YRS/> IM: CPT | Performed by: OBSTETRICS & GYNECOLOGY

## 2024-02-15 RX ORDER — DIPHENHYDRAMINE HCL 25 MG
25 TABLET ORAL EVERY 6 HOURS PRN
Status: DISCONTINUED | OUTPATIENT
Start: 2024-02-15 | End: 2024-02-16 | Stop reason: HOSPADM

## 2024-02-15 RX ORDER — IBUPROFEN 800 MG/1
800 TABLET ORAL EVERY 8 HOURS PRN
Qty: 30 TABLET | Refills: 0 | Status: SHIPPED | OUTPATIENT
Start: 2024-02-15

## 2024-02-15 RX ADMIN — DOCUSATE SODIUM 100 MG: 100 CAPSULE, LIQUID FILLED ORAL at 09:39

## 2024-02-15 RX ADMIN — IBUPROFEN 800 MG: 800 TABLET, FILM COATED ORAL at 09:40

## 2024-02-15 RX ADMIN — IBUPROFEN 800 MG: 800 TABLET, FILM COATED ORAL at 20:07

## 2024-02-15 RX ADMIN — ACETAMINOPHEN 1000 MG: 500 TABLET ORAL at 16:15

## 2024-02-15 RX ADMIN — SIMETHICONE 80 MG: 80 TABLET, CHEWABLE ORAL at 20:07

## 2024-02-15 RX ADMIN — DIPHENHYDRAMINE HCL 25 MG: 25 TABLET ORAL at 12:45

## 2024-02-15 RX ADMIN — DOCUSATE SODIUM 100 MG: 100 CAPSULE, LIQUID FILLED ORAL at 20:07

## 2024-02-15 RX ADMIN — ACETAMINOPHEN 1000 MG: 500 TABLET ORAL at 06:49

## 2024-02-15 RX ADMIN — FERROUS SULFATE TAB 325 MG (65 MG ELEMENTAL FE) 325 MG: 325 (65 FE) TAB at 09:40

## 2024-02-15 ASSESSMENT — PAIN SCALES - GENERAL
PAINLEVEL_OUTOF10: 5
PAINLEVEL_OUTOF10: 2
PAINLEVEL_OUTOF10: 2
PAINLEVEL_OUTOF10: 4
PAINLEVEL_OUTOF10: 4

## 2024-02-15 ASSESSMENT — PAIN DESCRIPTION - LOCATION
LOCATION: PERINEUM
LOCATION: BACK;HIP
LOCATION: HIP
LOCATION: BACK

## 2024-02-15 ASSESSMENT — PAIN - FUNCTIONAL ASSESSMENT
PAIN_FUNCTIONAL_ASSESSMENT: ACTIVITIES ARE NOT PREVENTED

## 2024-02-15 ASSESSMENT — PAIN DESCRIPTION - DESCRIPTORS
DESCRIPTORS: DISCOMFORT;CRAMPING;DULL
DESCRIPTORS: ACHING;DISCOMFORT;TENDER
DESCRIPTORS: DISCOMFORT
DESCRIPTORS: DISCOMFORT

## 2024-02-15 ASSESSMENT — PAIN DESCRIPTION - ORIENTATION
ORIENTATION: RIGHT
ORIENTATION: LOWER
ORIENTATION: LOWER
ORIENTATION: RIGHT

## 2024-02-15 NOTE — PLAN OF CARE
Problem: Discharge Planning  Goal: Discharge to home or other facility with appropriate resources  Outcome: Progressing     Problem: Pain  Goal: Verbalizes/displays adequate comfort level or baseline comfort level  Outcome: Progressing     Problem: Postpartum  Goal: Experiences normal postpartum course  Description:  Postpartum OB-Pregnancy care plan goal which identifies if the mother is experiencing a normal postpartum course  Outcome: Progressing     Problem: Postpartum  Goal: Appropriate maternal -  bonding  Description:  Postpartum OB-Pregnancy care plan goal which identifies if the mother and  are bonding appropriately  Outcome: Progressing     Problem: Postpartum  Goal: Establishment of infant feeding pattern  Description:  Postpartum OB-Pregnancy care plan goal which identifies if the mother is establishing a feeding pattern with their   Outcome: Progressing     Problem: Postpartum  Goal: Incisions, wounds, or drain sites healing without S/S of infection  Outcome: Progressing     Problem: Infection - Adult  Goal: Absence of infection at discharge  Outcome: Progressing     Problem: Infection - Adult  Goal: Absence of infection during hospitalization  Outcome: Progressing     Problem: Safety - Adult  Goal: Free from fall injury  Outcome: Progressing

## 2024-02-15 NOTE — FLOWSHEET NOTE
Medicated for complaint of back pain. Patient states she may have contracted Poison Ivy from her son. Few scatted red raised lesions noted on upper  left thigh

## 2024-02-15 NOTE — PROGRESS NOTES
Postpartum Day 1: Vaginal Delivery    The patient feels well.  Pain is well controlled with current medications. Baby is feeding via breast.     Objective:        Vitals:    02/15/24 0700   BP: 128/76   Pulse: 76   Resp: 16   Temp: 97.8 °F (36.6 °C)   SpO2: 97%         Lab Results   Component Value Date    WBC 14.1 (H) 02/13/2024    HGB 9.8 (L) 02/15/2024    HCT 31.3 (L) 02/15/2024    MCV 82.5 02/13/2024     02/13/2024       General:    alert, appears stated age, and cooperative   Lochia:  appropriate   Uterine    firm   DVT Evaluation:  No evidence of DVT seen on physical exam.     Assessment:     Status post Vaginal Delivery.good    Plan:     Continue current care.

## 2024-02-15 NOTE — CARE COORDINATION
SW Discharge Planning   SW received consult for \" PPD score of 10      Pt states she has anxiety and panic attacks states she see Gre[facility] I\"    SW spoke with patient and provided her with information, education and post partum depression resources. Patient appeared knowledgeable about pot partum and is already seen by outside services     Electronically signed by JOYA Garcia on 2/15/2024 at 1:24 PM

## 2024-02-15 NOTE — PROGRESS NOTES
Patient ambulated to bathroom. Able to void large amount. Bleeding normal. New pad and underwear placed.

## 2024-02-15 NOTE — FLOWSHEET NOTE
Patient admitted into room and oriented to surroundings. Introduced self and wrote this RN's name and phone extension on patient's white board. Phone and nurse's call light at patient's bedside and instructed to use for any needs. Patient instructed on new admission informational packet at bedside. Patient instructed on mom baby unit policies and procedures. Patient also instructed on unit visitation policy and that one same support person 18 years old or older may stay overnight if desired. Patient verbalized understanding of all of the above.

## 2024-02-15 NOTE — PROGRESS NOTES
Name: Skyla Prado                Protestant: Denominational   Referral: Baby [place]      Assessment:  Parent(s) were receptive to  visit and baby blessing.        Intervention:   provided blessing for new born and parent(s) and distributed prayer card for family. Patient shared...     Outcome:  Parent/S appreciated blessing for child.     Plan:  Chaplains will remain available to offer spiritual and emotional support as needed.       Electronically signed by GREY Jolly, on 2/15/2024 at 3:35 PM.  [facility]  [facility]  [phone]

## 2024-02-16 VITALS
DIASTOLIC BLOOD PRESSURE: 63 MMHG | SYSTOLIC BLOOD PRESSURE: 133 MMHG | TEMPERATURE: 97.3 F | RESPIRATION RATE: 16 BRPM | HEART RATE: 74 BPM | OXYGEN SATURATION: 96 %

## 2024-02-16 PROCEDURE — 6370000000 HC RX 637 (ALT 250 FOR IP): Performed by: OBSTETRICS & GYNECOLOGY

## 2024-02-16 RX ADMIN — IBUPROFEN 800 MG: 800 TABLET, FILM COATED ORAL at 06:18

## 2024-02-16 RX ADMIN — HYDROCODONE BITARTRATE AND ACETAMINOPHEN 1 TABLET: 5; 325 TABLET ORAL at 01:12

## 2024-02-16 RX ADMIN — DOCUSATE SODIUM 100 MG: 100 CAPSULE, LIQUID FILLED ORAL at 08:24

## 2024-02-16 RX ADMIN — HYDROCODONE BITARTRATE AND ACETAMINOPHEN 1 TABLET: 5; 325 TABLET ORAL at 08:26

## 2024-02-16 ASSESSMENT — PAIN SCALES - GENERAL
PAINLEVEL_OUTOF10: 0
PAINLEVEL_OUTOF10: 2
PAINLEVEL_OUTOF10: 7
PAINLEVEL_OUTOF10: 8
PAINLEVEL_OUTOF10: 4

## 2024-02-16 ASSESSMENT — PAIN - FUNCTIONAL ASSESSMENT
PAIN_FUNCTIONAL_ASSESSMENT: ACTIVITIES ARE NOT PREVENTED

## 2024-02-16 ASSESSMENT — PAIN DESCRIPTION - ORIENTATION
ORIENTATION: LEFT;RIGHT
ORIENTATION: RIGHT;LEFT
ORIENTATION: LOWER

## 2024-02-16 ASSESSMENT — PAIN DESCRIPTION - LOCATION
LOCATION: ABDOMEN
LOCATION: BACK;HIP
LOCATION: BACK;HIP

## 2024-02-16 ASSESSMENT — PAIN DESCRIPTION - ONSET: ONSET: GRADUAL

## 2024-02-16 ASSESSMENT — PAIN DESCRIPTION - FREQUENCY: FREQUENCY: INTERMITTENT

## 2024-02-16 ASSESSMENT — PAIN DESCRIPTION - DESCRIPTORS
DESCRIPTORS: ACHING;THROBBING;TENDER;SORE
DESCRIPTORS: CRAMPING

## 2024-02-16 ASSESSMENT — PAIN DESCRIPTION - PAIN TYPE: TYPE: ACUTE PAIN

## 2024-02-16 NOTE — PROGRESS NOTES
CLINICAL PHARMACY NOTE: MEDS TO BEDS    Total # of Prescriptions Filled: 1   The following medications were delivered to the patient:  Motrin 800 mg       Additional Documentation:

## 2024-02-16 NOTE — PLAN OF CARE
Problem: Discharge Planning  Goal: Discharge to home or other facility with appropriate resources  Outcome: Progressing     Problem: Pain  Goal: Verbalizes/displays adequate comfort level or baseline comfort level  Outcome: Progressing  Flowsheets (Taken 2/15/2024 2301)  Verbalizes/displays adequate comfort level or baseline comfort level:   Encourage patient to monitor pain and request assistance   Assess pain using appropriate pain scale   Administer analgesics based on type and severity of pain and evaluate response   Implement non-pharmacological measures as appropriate and evaluate response   Consider cultural and social influences on pain and pain management   Notify Licensed Independent Practitioner if interventions unsuccessful or patient reports new pain     Problem: Postpartum  Goal: Experiences normal postpartum course  Description:  Postpartum OB-Pregnancy care plan goal which identifies if the mother is experiencing a normal postpartum course  Outcome: Progressing     Problem: Safety - Adult  Goal: Free from fall injury  Outcome: Progressing  Flowsheets (Taken 2/15/2024 2301)  Free From Fall Injury:   Based on caregiver fall risk screen, instruct family/caregiver to ask for assistance with transferring infant if caregiver noted to have fall risk factors   Instruct family/caregiver on patient safety

## 2024-02-16 NOTE — DISCHARGE INSTRUCTIONS
pain  If you develop a warm, red, tender area on your breast or develop a fever contact your OB provider.    For breastfeeding moms:  If you become engorged, feeding may be more difficult or painful for 1-2 days.  You may find it helpful to hand express some milk so that the infant can latch on more easily.   While breastfeeding, continue to take your prenatal vitamins as directed by your doctor or midwife.   Only take medications verified as safe for breastfeeding.    For non-breastfeeding moms:  You may apply ice packs to your breasts over your bra for twenty minutes at a time for comfort.  Avoid stimulation to your breasts, when showering allow the water to strike your back not your breasts.    Wear a good fitting bra until your milk dries, such as a sports bra.    INCISIONAL CARE / CHRISTINA CARE  Clean your incision in the shower with mild soap.  After shower pat the incision area dry and leave open to air.  If used, Steri-stipes should be removed by 2 weeks.  If used, Staples should be removed by the OB in office by 1 week.  If used/ordered, an abdominal binder may provide support for your incision.   Use the christina-bottle after toileting until bleeding stops.  Cleanse your perineum from front to back  If used, stitches or internal clips will dissolve in 4-6 weeks.  You may use a sitz bath or soak in a clean tub as needed for comfort.  Kegel exercises will help restore bladder control.     SWELLING  Keep your legs elevated when sitting or lying.   When wearing stocking or socks, make sure they are not too tight.    WHEN TO CALL THE DOCTOR  If you have a temp of 100.4 or more.   If your bleeding has increased and you are saturating a pad in an hour.  Your abdomen is tender to touch.  You are passing blood clots bigger than the size of a lemon.  If you are experiencing extreme weakness or dizziness.   If you are having flu-like symptoms such as achy muscles or joints.  There is a foul smell or a green color to your  vaginal bleeding.  If you have pain that cannot be relieved.  You have persistent burning with urination or frequency.   Call if you have concerns about your well-being.  You are unable to sleep, eat, or are having thoughts of harming yourself or your baby.   You have swelling, bleeding, drainage, foul odor, redness, or warmth in/around your incision or stitches.  You have a red, warm, tender area in you calf.

## 2024-02-19 LAB — SURGICAL PATHOLOGY REPORT: NORMAL

## 2024-02-20 NOTE — DISCHARGE SUMMARY
Obstetrical Discharge Form        Patient ID:  Skyla Prado  [id number]  33 y.o.  1990    Admit date: 2024    Discharge date: 2024     Admitting Physician: Kory Gordon MD    Discharge Diagnoses: 34 weeks gestation of pregnancy [Z3A.34]    Final Diagnosis:   Principal Problem:    34 weeks gestation of pregnancy  Resolved Problems:    * No resolved hospital problems. *      Discharged Condition: good      Gestational Age:34w2d    Antepartum complications: premature labor     Date of Delivery: 24    Type of Delivery: vaginal, spontaneous    Delivered By: Jefry REESE         Baby:     Information for the patient's :  Gato Prado [[id number]]        Anesthesia: Epidural    Intrapartum complications: None    Feeding method: breast    Hospital Course: Uneventful.  Patient recovered well without any issues     Discharged Condition: stable to home    Discharge Medication:      Medication List        START taking these medications      ibuprofen 800 MG tablet  Commonly known as: ADVIL;MOTRIN  Take 1 tablet by mouth every 8 hours as needed for Pain            CONTINUE taking these medications      PRENATAL VITAMIN PO               Where to Get Your Medications        These medications were sent to [address] -  [phone] - F [phone]  [address] [place], [place] [id number]      Phone: [phone]   ibuprofen 800 MG tablet          Postpartum complications: none    Note that over 30 minutes was spent in preparing discharge papers, discussing discharge with patient, medication review, etc.    Discharge Date: 2024    Plan:   Follow up in 6 week(s)

## 2024-02-26 NOTE — ANESTHESIA POSTPROCEDURE EVALUATION
Department of Anesthesiology  Postprocedure Note    Patient: Skyla Prado  MRN: [id number]  YOB: 1990  Date of evaluation: 2/26/2024    Procedure Summary       Date: 02/14/24 Room / Location:     Anesthesia Start: 1323 Anesthesia Stop: 1727    Procedure: Labor Analgesia Diagnosis:     Scheduled Providers:  Responsible Provider: Aldo Scott DO    Anesthesia Type: epidural ASA Status: 3            Anesthesia Type: No value filed.    Salma Phase I:      Salma Phase II:      Anesthesia Post Evaluation    Level of consciousness: awake  Pain score: 3  Airway patency: patent  Nausea & Vomiting: no vomiting and no nausea  Cardiovascular status: hemodynamically stable  Respiratory status: acceptable  Hydration status: stable  Pain management: adequate    No notable events documented.  
Yes